# Patient Record
Sex: MALE | Race: WHITE | Employment: OTHER | URBAN - METROPOLITAN AREA
[De-identification: names, ages, dates, MRNs, and addresses within clinical notes are randomized per-mention and may not be internally consistent; named-entity substitution may affect disease eponyms.]

---

## 2024-08-23 ENCOUNTER — ANESTHESIA EVENT (OUTPATIENT)
Dept: OPERATING ROOM | Age: 59
End: 2024-08-23
Payer: COMMERCIAL

## 2024-08-23 ENCOUNTER — HOSPITAL ENCOUNTER (OUTPATIENT)
Age: 59
Setting detail: OUTPATIENT SURGERY
Discharge: HOME | End: 2024-08-23
Attending: ORTHOPAEDIC SURGERY | Admitting: ORTHOPAEDIC SURGERY
Payer: COMMERCIAL

## 2024-08-23 ENCOUNTER — ANESTHESIA (OUTPATIENT)
Dept: OPERATING ROOM | Age: 59
End: 2024-08-23
Payer: COMMERCIAL

## 2024-08-23 ENCOUNTER — OFFICE VISIT (OUTPATIENT)
Dept: ORTHOPEDIC SURGERY | Age: 59
End: 2024-08-23

## 2024-08-23 VITALS — BODY MASS INDEX: 23.54 KG/M2 | HEIGHT: 67 IN | WEIGHT: 150 LBS

## 2024-08-23 VITALS
HEART RATE: 75 BPM | RESPIRATION RATE: 16 BRPM | DIASTOLIC BLOOD PRESSURE: 77 MMHG | TEMPERATURE: 97 F | SYSTOLIC BLOOD PRESSURE: 126 MMHG | OXYGEN SATURATION: 94 %

## 2024-08-23 DIAGNOSIS — S86.011A ACHILLES RUPTURE, RIGHT, INITIAL ENCOUNTER: Primary | ICD-10-CM

## 2024-08-23 PROCEDURE — 7100000000 HC PACU RECOVERY - FIRST 15 MIN: Performed by: ORTHOPAEDIC SURGERY

## 2024-08-23 PROCEDURE — 6360000002 HC RX W HCPCS: Performed by: ORTHOPAEDIC SURGERY

## 2024-08-23 PROCEDURE — 6370000000 HC RX 637 (ALT 250 FOR IP): Performed by: ANESTHESIOLOGY

## 2024-08-23 PROCEDURE — 3700000000 HC ANESTHESIA ATTENDED CARE: Performed by: ORTHOPAEDIC SURGERY

## 2024-08-23 PROCEDURE — 6360000002 HC RX W HCPCS: Performed by: ANESTHESIOLOGY

## 2024-08-23 PROCEDURE — 3600000013 HC SURGERY LEVEL 3 ADDTL 15MIN: Performed by: ORTHOPAEDIC SURGERY

## 2024-08-23 PROCEDURE — 2580000003 HC RX 258: Performed by: ORTHOPAEDIC SURGERY

## 2024-08-23 PROCEDURE — 7100000011 HC PHASE II RECOVERY - ADDTL 15 MIN: Performed by: ORTHOPAEDIC SURGERY

## 2024-08-23 PROCEDURE — 7100000010 HC PHASE II RECOVERY - FIRST 15 MIN: Performed by: ORTHOPAEDIC SURGERY

## 2024-08-23 PROCEDURE — 27654 REPAIR OF ACHILLES TENDON: CPT | Performed by: ORTHOPAEDIC SURGERY

## 2024-08-23 PROCEDURE — 3700000001 HC ADD 15 MINUTES (ANESTHESIA): Performed by: ORTHOPAEDIC SURGERY

## 2024-08-23 PROCEDURE — 2580000003 HC RX 258: Performed by: ANESTHESIOLOGY

## 2024-08-23 PROCEDURE — 2500000003 HC RX 250 WO HCPCS

## 2024-08-23 PROCEDURE — 3600000003 HC SURGERY LEVEL 3 BASE: Performed by: ORTHOPAEDIC SURGERY

## 2024-08-23 PROCEDURE — 2709999900 HC NON-CHARGEABLE SUPPLY: Performed by: ORTHOPAEDIC SURGERY

## 2024-08-23 PROCEDURE — 7100000001 HC PACU RECOVERY - ADDTL 15 MIN: Performed by: ORTHOPAEDIC SURGERY

## 2024-08-23 PROCEDURE — 6360000002 HC RX W HCPCS

## 2024-08-23 PROCEDURE — 27654 REPAIR OF ACHILLES TENDON: CPT | Performed by: NURSE PRACTITIONER

## 2024-08-23 PROCEDURE — A4217 STERILE WATER/SALINE, 500 ML: HCPCS | Performed by: ORTHOPAEDIC SURGERY

## 2024-08-23 RX ORDER — HYDRALAZINE HYDROCHLORIDE 20 MG/ML
10 INJECTION INTRAMUSCULAR; INTRAVENOUS ONCE
Status: COMPLETED | OUTPATIENT
Start: 2024-08-23 | End: 2024-08-23

## 2024-08-23 RX ORDER — NALOXONE HYDROCHLORIDE 0.4 MG/ML
INJECTION, SOLUTION INTRAMUSCULAR; INTRAVENOUS; SUBCUTANEOUS PRN
Status: DISCONTINUED | OUTPATIENT
Start: 2024-08-23 | End: 2024-08-23 | Stop reason: HOSPADM

## 2024-08-23 RX ORDER — DEXAMETHASONE SODIUM PHOSPHATE 4 MG/ML
INJECTION, SOLUTION INTRA-ARTICULAR; INTRALESIONAL; INTRAMUSCULAR; INTRAVENOUS; SOFT TISSUE PRN
Status: DISCONTINUED | OUTPATIENT
Start: 2024-08-23 | End: 2024-08-23 | Stop reason: SDUPTHER

## 2024-08-23 RX ORDER — ROCURONIUM BROMIDE 10 MG/ML
INJECTION, SOLUTION INTRAVENOUS PRN
Status: DISCONTINUED | OUTPATIENT
Start: 2024-08-23 | End: 2024-08-23 | Stop reason: SDUPTHER

## 2024-08-23 RX ORDER — CEPHALEXIN 500 MG/1
500 CAPSULE ORAL 4 TIMES DAILY
Qty: 20 CAPSULE | Refills: 0 | Status: SHIPPED | OUTPATIENT
Start: 2024-08-23 | End: 2024-08-28

## 2024-08-23 RX ORDER — SODIUM CHLORIDE 0.9 % (FLUSH) 0.9 %
5-40 SYRINGE (ML) INJECTION EVERY 12 HOURS SCHEDULED
Status: DISCONTINUED | OUTPATIENT
Start: 2024-08-23 | End: 2024-08-23 | Stop reason: HOSPADM

## 2024-08-23 RX ORDER — LIDOCAINE HYDROCHLORIDE 20 MG/ML
INJECTION, SOLUTION EPIDURAL; INFILTRATION; INTRACAUDAL; PERINEURAL PRN
Status: DISCONTINUED | OUTPATIENT
Start: 2024-08-23 | End: 2024-08-23 | Stop reason: SDUPTHER

## 2024-08-23 RX ORDER — FENTANYL CITRATE 0.05 MG/ML
25 INJECTION, SOLUTION INTRAMUSCULAR; INTRAVENOUS EVERY 5 MIN PRN
Status: DISCONTINUED | OUTPATIENT
Start: 2024-08-23 | End: 2024-08-23 | Stop reason: HOSPADM

## 2024-08-23 RX ORDER — TRAMADOL HYDROCHLORIDE 50 MG/1
50 TABLET ORAL ONCE
Status: COMPLETED | OUTPATIENT
Start: 2024-08-23 | End: 2024-08-23

## 2024-08-23 RX ORDER — SODIUM CHLORIDE 9 MG/ML
INJECTION, SOLUTION INTRAVENOUS PRN
Status: DISCONTINUED | OUTPATIENT
Start: 2024-08-23 | End: 2024-08-23 | Stop reason: HOSPADM

## 2024-08-23 RX ORDER — BUPIVACAINE HYDROCHLORIDE 5 MG/ML
INJECTION, SOLUTION EPIDURAL; INTRACAUDAL
Status: COMPLETED | OUTPATIENT
Start: 2024-08-23 | End: 2024-08-23

## 2024-08-23 RX ORDER — FENTANYL CITRATE 50 UG/ML
INJECTION, SOLUTION INTRAMUSCULAR; INTRAVENOUS PRN
Status: DISCONTINUED | OUTPATIENT
Start: 2024-08-23 | End: 2024-08-23 | Stop reason: SDUPTHER

## 2024-08-23 RX ORDER — PROPOFOL 10 MG/ML
INJECTION, EMULSION INTRAVENOUS PRN
Status: DISCONTINUED | OUTPATIENT
Start: 2024-08-23 | End: 2024-08-23 | Stop reason: SDUPTHER

## 2024-08-23 RX ORDER — SODIUM CHLORIDE 0.9 % (FLUSH) 0.9 %
5-40 SYRINGE (ML) INJECTION PRN
Status: DISCONTINUED | OUTPATIENT
Start: 2024-08-23 | End: 2024-08-23 | Stop reason: HOSPADM

## 2024-08-23 RX ORDER — ONDANSETRON 2 MG/ML
INJECTION INTRAMUSCULAR; INTRAVENOUS PRN
Status: DISCONTINUED | OUTPATIENT
Start: 2024-08-23 | End: 2024-08-23 | Stop reason: SDUPTHER

## 2024-08-23 RX ORDER — ONDANSETRON 2 MG/ML
4 INJECTION INTRAMUSCULAR; INTRAVENOUS
Status: DISCONTINUED | OUTPATIENT
Start: 2024-08-23 | End: 2024-08-23 | Stop reason: HOSPADM

## 2024-08-23 RX ORDER — TRAMADOL HYDROCHLORIDE 50 MG/1
50 TABLET ORAL EVERY 6 HOURS PRN
Qty: 12 TABLET | Refills: 0 | Status: SHIPPED | OUTPATIENT
Start: 2024-08-23 | End: 2024-08-26

## 2024-08-23 RX ADMIN — PROPOFOL 150 MG: 10 INJECTION, EMULSION INTRAVENOUS at 09:42

## 2024-08-23 RX ADMIN — SODIUM CHLORIDE 2000 MG: 900 INJECTION INTRAVENOUS at 09:51

## 2024-08-23 RX ADMIN — SUGAMMADEX 200 MG: 100 INJECTION, SOLUTION INTRAVENOUS at 10:31

## 2024-08-23 RX ADMIN — TRAMADOL HYDROCHLORIDE 50 MG: 50 TABLET ORAL at 12:26

## 2024-08-23 RX ADMIN — ROCURONIUM BROMIDE 40 MG: 10 INJECTION, SOLUTION INTRAVENOUS at 09:42

## 2024-08-23 RX ADMIN — HYDROMORPHONE HYDROCHLORIDE 0.25 MG: 1 INJECTION, SOLUTION INTRAMUSCULAR; INTRAVENOUS; SUBCUTANEOUS at 10:03

## 2024-08-23 RX ADMIN — SODIUM CHLORIDE: 9 INJECTION, SOLUTION INTRAVENOUS at 09:36

## 2024-08-23 RX ADMIN — LIDOCAINE HYDROCHLORIDE 80 MG: 20 INJECTION, SOLUTION EPIDURAL; INFILTRATION; INTRACAUDAL; PERINEURAL at 09:42

## 2024-08-23 RX ADMIN — FENTANYL CITRATE 25 MCG: 50 INJECTION INTRAMUSCULAR; INTRAVENOUS at 09:40

## 2024-08-23 RX ADMIN — ONDANSETRON 4 MG: 2 INJECTION INTRAMUSCULAR; INTRAVENOUS at 10:13

## 2024-08-23 RX ADMIN — HYDRALAZINE HYDROCHLORIDE 10 MG: 20 INJECTION INTRAMUSCULAR; INTRAVENOUS at 11:22

## 2024-08-23 RX ADMIN — FENTANYL CITRATE 50 MCG: 50 INJECTION INTRAMUSCULAR; INTRAVENOUS at 09:52

## 2024-08-23 RX ADMIN — PROPOFOL 30 MG: 10 INJECTION, EMULSION INTRAVENOUS at 10:00

## 2024-08-23 RX ADMIN — FENTANYL CITRATE 25 MCG: 50 INJECTION INTRAMUSCULAR; INTRAVENOUS at 09:42

## 2024-08-23 RX ADMIN — HYDROMORPHONE HYDROCHLORIDE 0.25 MG: 1 INJECTION, SOLUTION INTRAMUSCULAR; INTRAVENOUS; SUBCUTANEOUS at 10:22

## 2024-08-23 RX ADMIN — DEXAMETHASONE SODIUM PHOSPHATE 8 MG: 4 INJECTION, SOLUTION INTRAMUSCULAR; INTRAVENOUS at 09:53

## 2024-08-23 RX ADMIN — FENTANYL CITRATE 25 MCG: 0.05 INJECTION, SOLUTION INTRAMUSCULAR; INTRAVENOUS at 11:33

## 2024-08-23 ASSESSMENT — PAIN DESCRIPTION - FREQUENCY
FREQUENCY: CONTINUOUS

## 2024-08-23 ASSESSMENT — PAIN DESCRIPTION - PAIN TYPE
TYPE: SURGICAL PAIN

## 2024-08-23 ASSESSMENT — PAIN SCALES - GENERAL
PAINLEVEL_OUTOF10: 4
PAINLEVEL_OUTOF10: 0
PAINLEVEL_OUTOF10: 5
PAINLEVEL_OUTOF10: 1
PAINLEVEL_OUTOF10: 2

## 2024-08-23 ASSESSMENT — PAIN DESCRIPTION - ONSET
ONSET: ON-GOING
ONSET: ON-GOING
ONSET: GRADUAL
ONSET: ON-GOING
ONSET: ON-GOING
ONSET: AWAKENED FROM SLEEP

## 2024-08-23 ASSESSMENT — PAIN - FUNCTIONAL ASSESSMENT
PAIN_FUNCTIONAL_ASSESSMENT: PREVENTS OR INTERFERES SOME ACTIVE ACTIVITIES AND ADLS
PAIN_FUNCTIONAL_ASSESSMENT: NONE - DENIES PAIN
PAIN_FUNCTIONAL_ASSESSMENT: PREVENTS OR INTERFERES SOME ACTIVE ACTIVITIES AND ADLS

## 2024-08-23 ASSESSMENT — PAIN DESCRIPTION - DESCRIPTORS
DESCRIPTORS: ACHING;DISCOMFORT
DESCRIPTORS: DISCOMFORT
DESCRIPTORS: ACHING
DESCRIPTORS: ACHING;DISCOMFORT
DESCRIPTORS: DISCOMFORT
DESCRIPTORS: DULL

## 2024-08-23 ASSESSMENT — PAIN SCALES - WONG BAKER
WONGBAKER_NUMERICALRESPONSE: NO HURT

## 2024-08-23 ASSESSMENT — PAIN DESCRIPTION - LOCATION
LOCATION: LEG
LOCATION: ANKLE
LOCATION: LEG
LOCATION: ANKLE

## 2024-08-23 ASSESSMENT — ENCOUNTER SYMPTOMS: SHORTNESS OF BREATH: 0

## 2024-08-23 ASSESSMENT — PAIN DESCRIPTION - ORIENTATION
ORIENTATION: RIGHT

## 2024-08-23 ASSESSMENT — LIFESTYLE VARIABLES: SMOKING_STATUS: 0

## 2024-08-23 NOTE — ANESTHESIA PRE PROCEDURE
Department of Anesthesiology  Preprocedure Note       Name:  Carrillo Hairston   Age:  59 y.o.  :  1965                                          MRN:  [id number]         Date:  2024      Surgeon: Surgeon(s):  Stefanie Broderick MD    Procedure: Procedure(s):  RIGHT ACHILLES TENDON REPAIR    Medications prior to admission:   Prior to Admission medications    Medication Sig Start Date End Date Taking? Authorizing Provider   traMADol (ULTRAM) 50 MG tablet Take 1 tablet by mouth every 6 hours as needed for Pain for up to 3 days. Intended supply: 3 days. Take lowest dose possible to manage pain Max Daily Amount: 200 mg 24 Yes Stefanie Broderick MD   cephALEXin (KEFLEX) 500 MG capsule Take 1 capsule by mouth 4 times daily for 5 days 24 Yes Stefanie Broderick MD       Current medications:    Current Facility-Administered Medications   Medication Dose Route Frequency Provider Last Rate Last Admin    sodium chloride flush 0.9 % injection 5-40 mL  5-40 mL IntraVENous 2 times per day Mabel Guerrier MD        sodium chloride flush 0.9 % injection 5-40 mL  5-40 mL IntraVENous PRN Mabel Guerrier MD        0.9 % sodium chloride infusion   IntraVENous PRN Mabel Guerrier MD           Allergies:  No Known Allergies    Problem List:    Patient Active Problem List   Diagnosis Code    Achilles rupture, right, initial encounter S86.011A       Past Medical History:  History reviewed. No pertinent past medical history.    Past Surgical History:  History reviewed. No pertinent surgical history.    Social History:    Social History     Tobacco Use    Smoking status: Not on file    Smokeless tobacco: Not on file   Substance Use Topics    Alcohol use: Not on file                                Counseling given: Not Answered      Vital Signs (Current):   Vitals:    24 0911   BP: (!) 162/104   Pulse: 75   Resp: 18   Temp: 97.9 °F (36.6 °C)   TempSrc: Temporal   SpO2: 96%

## 2024-08-23 NOTE — FLOWSHEET NOTE
Patient and responsible adult verbalized understanding of discharge instructions, sedation medication, and potential complications including pain. Patient instructed to call Doctor if complications occur.  Pt. Feeling a little \"nauseated.\"  IVF running wide open.  Sluggish but able to eat and drink.  Given pain pill.

## 2024-08-23 NOTE — ANESTHESIA POSTPROCEDURE EVALUATION
Department of Anesthesiology  Postprocedure Note    Patient: Carrillo Hairston  MRN: [id number]  YOB: 1965  Date of evaluation: 8/23/2024    Procedure Summary       Date: 08/23/24 Room / Location: [address]    Anesthesia Start: 0936 Anesthesia Stop: 1047    Procedure: RIGHT ACHILLES TENDON REPAIR (Right: Ankle) Diagnosis:       Injury of right Achilles tendon, initial encounter      (Injury of right Achilles tendon, initial encounter [S86.001A])    Surgeons: Stefanie Broderick MD Responsible Provider: Mabel Guerrier MD    Anesthesia Type: general ASA Status: 3            Anesthesia Type: No value filed.    Dale Phase I: Dale Score: 10    Dale Phase II: Dale Score: 10    Anesthesia Post Evaluation    Patient location during evaluation: PACU  Patient participation: complete - patient participated  Level of consciousness: awake and alert  Pain score: 3  Airway patency: patent  Nausea & Vomiting: no nausea and no vomiting  Cardiovascular status: blood pressure returned to baseline  Respiratory status: acceptable  Hydration status: euvolemic  Pain management: adequate    No notable events documented.

## 2024-08-23 NOTE — PROGRESS NOTES
Pt received into bay 4 from OR. Pt with oral airway. Report obtained. Dressing c/d/I. Vss. Pt stable. Will monitor at bedside while airway in place.

## 2024-08-23 NOTE — PROGRESS NOTES
Pt hypertensive. Dr. Guerrier updated on current vital signs. Pt given 10mg IVP Hydralazine per order.

## 2024-08-23 NOTE — DISCHARGE INSTRUCTIONS
Post op instruction:  1- D/C home  2- Dx Right posterior ankle pain / Achilles tendon rupture.   3- NWB right ankle  4- Elevation surgical site, with ice  5- Keep splint dry and clean  6- F/U in 2 weeks.  7- For DVT prophylaxis- Aspirin 325 mg daily     Stefanie Broderick MD, 8/23/2024

## 2024-08-23 NOTE — PROGRESS NOTES
Pt awake, alert, and oriented. Room air. Vss. Pt stable. Pain level satisfied. Report given to Joy SILVA. Pt ready for phase II.

## 2024-08-23 NOTE — OP NOTE
paratenon attached to subcutaneous tissue.  We then exposed the Achilles tendon, which was found to be completely ruptured.  The plantaris was still intact, and we used that for a secondary repair as an enforcement graft.  At this point, we trimmed the ends of the Achilles tendon and performed a decompression fasciotomy of the posterior leg compartment.  We then used a #5 FiberWire in a Krackow-type fashion for the proximal stump as well as the distal stump.  There was good suturing and good fixation of the Achilles tendon with the sutures.  At this point, we plantarflexed the ankle, and we tied the sutures with good apposition of the Achilles tendon.  We then buried the knots deep into the Achilles tendon and enforced the repair with an 0 Vicryl.  The plantaris tendon was used as an enforcement graft as well as incorporated into the FiberWire sutures.  We tested the repair, and it was found to be strong.  There was some shredding of the Achilles tendon proximally, but there was a good suturing of the Achilles tendon.  At this point, we let the tourniquet down, and hemostasis was secured.  We irrigated the incision copiously with normal saline mixed with gentamicin.  We closed the paratenon with a 2-0 Vicryl, subcu with a 3-0 Vicryl, and the skin with a 4-0 Monocryl.  The Steri-Strips were then applied.  Dressing was applied in the form of Xeroform, 4x4s, sterile Webril, and a posterior splint was applied.    The patient tolerated the procedure well and was taken to the Recovery in stable condition.    Olya Estevez CNP was 1st Assist given the nature of the procedure that needed advanced assistance. She assisted in all aspect of the procedure, including but limited to draping in a sterile fashion, exposure of surgical area, controlling bleeding, retracting and protecting important structures, achieve and maintain bone reduction and surgical wound closure with dressing application.    POSTOPERATIVE PLAN:  The  patient will be discharged home.  He is nonweightbearing for 6 weeks, but we can start range of motion in 2 weeks.          NURIA QUIÑONES MD      D:  08/23/2024 12:09:10     T:  08/23/2024 18:33:14     /YUMIKO  Job #:  [id number]     Doc#:  [id number]    CC:   Jaxson Longoria NP

## 2024-08-23 NOTE — H&P
Preoperative H&P Update    The patient's History and Physical in the medical record from 8/23/2024 was reviewed by me today.    History reviewed. No pertinent past medical history.  History reviewed. No pertinent surgical history.  No current facility-administered medications on file prior to encounter.     No current outpatient medications on file prior to encounter.       No Known Allergies   I reviewed the HPI, medications, allergies, reason for surgery, diagnosis and treatment plan and there has been no change.    The patient was evaluated by me today. Physical exam findings for this update include:    There were no vitals filed for this visit.  Airway is intact  Chest: chest clear, no wheezing, rales, normal symmetric air entry, no tachypnea, retractions or cyanosis  Heart: regular rate and rhythm ; heart sounds normal  Findings on exam of the body region where surgery is to be performed include:  Right posterior ankle pain / Achilles tendon rupture.     Electronically signed by Stefanie Broderick MD on 8/23/2024 at 8:58 AM

## 2024-08-23 NOTE — BRIEF OP NOTE
Brief Postoperative Note      Patient: Carrillo Hairston  YOB: 1965  MRN: [id number]    Date of Procedure: 8/23/2024    Pre-Op Diagnosis Codes:      * Injury of right Achilles tendon, initial encounter [S86.001A]    Post-Op Diagnosis: Same       Procedure(s):  RIGHT ACHILLES TENDON REPAIR    Surgeon(s):  Stefanie Broderick MD    Assistant: DIAZ Estevez    Anesthesia: General    Estimated Blood Loss (mL): Minimal    Complications: None    Specimens:   * No specimens in log *    Implants:  * No implants in log *      Drains: * No LDAs found *    Findings:  Infection Present At Time Of Surgery (PATOS) (choose all levels that have infection present):  No infection present  Other Findings: Same.    Electronically signed by Stefanie Broderick MD on 8/23/2024 at 12:01 PM

## 2024-09-04 ENCOUNTER — OFFICE VISIT (OUTPATIENT)
Dept: ORTHOPEDIC SURGERY | Age: 59
End: 2024-09-04

## 2024-09-04 DIAGNOSIS — S86.011A ACHILLES RUPTURE, RIGHT, INITIAL ENCOUNTER: Primary | ICD-10-CM

## 2024-09-04 PROCEDURE — 99024 POSTOP FOLLOW-UP VISIT: CPT | Performed by: ORTHOPAEDIC SURGERY

## 2024-09-04 NOTE — PROGRESS NOTES
DIAGNOSIS:  Right Achillis rupture, status post open repair.    DATE OF SURGERY:  8/23/2024.    HISTORY OF PRESENT ILLNESS:  Mr. Hairston 59 y.o.  male who came in today for 2 weeks postoperative visit. The patient denies any significant pain in the right ankle, 0/10. He has been in a splint , and non WB.  No numbness or tingling sensation. No fever or Chills.     PHYSICAL EXAMINATION:  The incision healing well.  No signs of any erythema or drainage, minimal swelling. He has no pain with the active or passive range of motion of the right ankle, but decrease ROM.  He has intact sensation distally, and he is neurovascularly intact.    IMAGING:  X-rays were taken in the office 9/4/2024, 3 views of the right ankle, and showed no fracture. No other abnormality.     IMPRESSION:  2 weeks out from right Achillis tendon repair, and doing very well.    PLAN: He placed in a boot with a heel lift, and TTWB. I have told the patient to work on ROM, The patient will come back for a follow up in 6 weeks.  At that time, we will start PT with WBAT.    No orders of the defined types were placed in this encounter.      Stefanie Broderick MD

## 2024-10-16 ENCOUNTER — OFFICE VISIT (OUTPATIENT)
Dept: ORTHOPEDIC SURGERY | Age: 59
End: 2024-10-16

## 2024-10-16 VITALS — WEIGHT: 149.91 LBS | BODY MASS INDEX: 23.53 KG/M2 | HEIGHT: 67 IN

## 2024-10-16 DIAGNOSIS — S86.011A ACHILLES RUPTURE, RIGHT, INITIAL ENCOUNTER: Primary | ICD-10-CM

## 2024-10-16 PROCEDURE — 99024 POSTOP FOLLOW-UP VISIT: CPT | Performed by: NURSE PRACTITIONER

## 2024-10-16 NOTE — PROGRESS NOTES
DIAGNOSIS:  Right Achillis rupture, status post open repair.    DATE OF SURGERY:  8/23/2024.    HISTORY OF PRESENT ILLNESS:  Mr. Hairston 59 y.o.  male who came in today for 8 weeks postoperative visit. The patient denies any significant pain in the right ankle, 0/10. He has been out of the boot for the past 2 weeks and has been full WB. He reports he is going up and down the stairs normally.  No numbness or tingling sensation. No fever or Chills.     PHYSICAL EXAMINATION:  The incision healing well.  No signs of any erythema or drainage, minimal swelling. He has no pain with the active or passive range of motion of the right ankle, but minimal decrease ROM.  He has intact sensation distally, and he is neurovascularly intact.    IMAGING:  X-rays were taken in the office 9/4/2024, 3 views of the right ankle, and showed no fracture. No other abnormality.     IMPRESSION:  8 weeks out from right Achillis tendon repair, and doing very well.    PLAN: He can discontinue the boot and WBAT. No heavy impact activities. . I have told the patient to work on ROM and strengthening exercises. He would like to do it on his own. The patient will come back for a follow up in 6 weeks.            Olya Estevez, ELICEO - CNP

## 2024-12-03 ENCOUNTER — OFFICE VISIT (OUTPATIENT)
Dept: ORTHOPEDIC SURGERY | Age: 59
End: 2024-12-03
Payer: COMMERCIAL

## 2024-12-03 DIAGNOSIS — S86.011A ACHILLES RUPTURE, RIGHT, INITIAL ENCOUNTER: Primary | ICD-10-CM

## 2024-12-03 PROCEDURE — 1036F TOBACCO NON-USER: CPT | Performed by: ORTHOPAEDIC SURGERY

## 2024-12-03 PROCEDURE — G8427 DOCREV CUR MEDS BY ELIG CLIN: HCPCS | Performed by: ORTHOPAEDIC SURGERY

## 2024-12-03 PROCEDURE — 99213 OFFICE O/P EST LOW 20 MIN: CPT | Performed by: ORTHOPAEDIC SURGERY

## 2024-12-03 PROCEDURE — 3017F COLORECTAL CA SCREEN DOC REV: CPT | Performed by: ORTHOPAEDIC SURGERY

## 2024-12-03 PROCEDURE — G8484 FLU IMMUNIZE NO ADMIN: HCPCS | Performed by: ORTHOPAEDIC SURGERY

## 2024-12-03 PROCEDURE — G8420 CALC BMI NORM PARAMETERS: HCPCS | Performed by: ORTHOPAEDIC SURGERY

## 2024-12-03 NOTE — PROGRESS NOTES
DIAGNOSIS:  Right Achillis rupture, status post open repair.    DATE OF SURGERY:  8/23/2024.    HISTORY OF PRESENT ILLNESS:  Mr. Hairston 59 y.o.  male who came in today for 3 months postoperative visit. The patient denies any significant pain in the right ankle, 0/10. He has been full WB.  No numbness or tingling sensation. No fever or Chills. He did PT on his own. He is active in skiing, and he is a .    No past medical history on file.    Past Surgical History:   Procedure Laterality Date    ACHILLES TENDON SURGERY Right 8/23/2024    RIGHT ACHILLES TENDON REPAIR performed by Stefanie Broderick MD at [facility] OR       Social History     Socioeconomic History    Marital status:      Spouse name: Not on file    Number of children: Not on file    Years of education: Not on file    Highest education level: Not on file   Occupational History    Not on file   Tobacco Use    Smoking status: Never    Smokeless tobacco: Never   Substance and Sexual Activity    Alcohol use: Not on file    Drug use: Not on file    Sexual activity: Not on file   Other Topics Concern    Not on file   Social History Narrative    Not on file     Social Determinants of Health     Financial Resource Strain: Not on file   Food Insecurity: Not on file   Transportation Needs: Not on file   Physical Activity: Not on file   Stress: Not on file   Social Connections: Not on file   Intimate Partner Violence: Not on file   Housing Stability: Not on file       No family history on file.    No current outpatient medications on file prior to visit.     No current facility-administered medications on file prior to visit.       Pertinent items are noted in HPI  Review of systems reviewed from Patient History Form and available in the patient's chart under the Media tab. No change noted.      PHYSICAL EXAMINATION:  Mr. Hairston is a very pleasant 59 y.o.  male who presents today in no acute distress, awake, alert, and oriented.

## (undated) DEVICE — BANDAGE COMPR W6INXL10YD ST M E WHITE/BEIGE

## (undated) DEVICE — SPLINT OCL 2 PLSTR SPLNTING SYS 15 LAYR 6INX20FT

## (undated) DEVICE — ELECTRODE PT RET AD L9FT HI MOIST COND ADH HYDRGEL CORDED

## (undated) DEVICE — PADDING CAST W6INXL4YD COT LO LINTING WYTEX

## (undated) DEVICE — SUTURE VICRYL + SZ 3-0 L18IN ABSRB UD SH 1/2 CIR TAPERCUT NDL VCP864D

## (undated) DEVICE — DRESSING,GAUZE,XEROFORM,CURAD,1"X8",ST: Brand: CURAD

## (undated) DEVICE — GLOVE SURG SZ 65 L12IN FNGR THK79MIL GRN LTX FREE

## (undated) DEVICE — GLOVE SURG SZ 65 THK91MIL LTX FREE SYN POLYISOPRENE

## (undated) DEVICE — SUTURE VICRYL + SZ 0 L27IN ABSRB UD CT-1 L36MM 1/2 CIR TAPR VCP260H

## (undated) DEVICE — BASIC SINGLE BASIN 1-LF: Brand: MEDLINE INDUSTRIES, INC.

## (undated) DEVICE — SUTURE FIBERWIRE SZ 5 L38IN NONABSORBABLE BLU L48MM 1/2 AR7211

## (undated) DEVICE — STRIP,CLOSURE,WOUND,MEDI-STRIP,1/2X4: Brand: MEDLINE

## (undated) DEVICE — GLOVE SURG 9 PF CRM STRL SENSICARE PI MIC LF

## (undated) DEVICE — LOWER EXTREMITY: Brand: MEDLINE INDUSTRIES, INC.

## (undated) DEVICE — SUTURE MONOCRYL + SZ 4-0 L18IN ABSRB UD L19MM PS-2 3/8 CIR MCP496G

## (undated) DEVICE — MERCY HEALTH WEST TURNOVER: Brand: MEDLINE INDUSTRIES, INC.

## (undated) DEVICE — NEEDLE HYPO 23GA L1.5IN TURQ POLYPR HUB S STL THN WALL IM

## (undated) DEVICE — SUTURE VICRYL + SZ 2-0 L18IN ABSRB UD CT1 L36MM 1/2 CIR VCP839D

## (undated) DEVICE — GOWN SIRUS NONREIN XL W/TWL: Brand: MEDLINE INDUSTRIES, INC.

## (undated) DEVICE — ZIMMER® STERILE DISPOSABLE TOURNIQUET CUFF WITH PLC, DUAL PORT, SINGLE BLADDER, 34 IN. (86 CM)

## (undated) DEVICE — MASTISOL ADHESIVE LIQ 2/3ML

## (undated) DEVICE — BANDAGE COMPR W6INXL12FT SMOOTH FOR LIMB EXSANG ESMARCH

## (undated) DEVICE — TRANSFER SET 3": Brand: MEDLINE INDUSTRIES, INC.

## (undated) DEVICE — TOWEL,OR,DSP,ST,BLUE,STD,4/PK,20PK/CS: Brand: MEDLINE

## (undated) DEVICE — GLOVE SURG SZ 8 CRM LTX FREE POLYISOPRENE POLYMER BEAD ANTI